# Patient Record
Sex: MALE | Race: WHITE | NOT HISPANIC OR LATINO | Employment: FULL TIME | URBAN - METROPOLITAN AREA
[De-identification: names, ages, dates, MRNs, and addresses within clinical notes are randomized per-mention and may not be internally consistent; named-entity substitution may affect disease eponyms.]

---

## 2019-08-12 DIAGNOSIS — N20.0 URIC ACID NEPHROLITHIASIS: Primary | ICD-10-CM

## 2019-08-14 ENCOUNTER — HOSPITAL ENCOUNTER (OUTPATIENT)
Dept: RADIOLOGY | Facility: HOSPITAL | Age: 34
Discharge: HOME | End: 2019-08-14
Payer: COMMERCIAL

## 2019-08-14 DIAGNOSIS — N20.0 URIC ACID NEPHROLITHIASIS: ICD-10-CM

## 2019-08-14 PROCEDURE — 74176 CT ABD & PELVIS W/O CONTRAST: CPT | Mod: TC

## 2019-11-12 ENCOUNTER — HOSPITAL ENCOUNTER (OUTPATIENT)
Dept: RADIOLOGY | Facility: HOSPITAL | Age: 34
Discharge: HOME | End: 2019-11-12
Attending: SPECIALIST
Payer: COMMERCIAL

## 2019-11-12 DIAGNOSIS — N20.0 URIC ACID NEPHROLITHIASIS: Primary | ICD-10-CM

## 2019-11-12 DIAGNOSIS — N20.0 URIC ACID NEPHROLITHIASIS: ICD-10-CM

## 2019-11-12 PROCEDURE — 74018 RADEX ABDOMEN 1 VIEW: CPT | Mod: TC,PO

## 2020-09-08 DIAGNOSIS — N20.0 CALCULUS OF KIDNEY: Primary | ICD-10-CM

## 2020-09-14 ENCOUNTER — HOSPITAL ENCOUNTER (OUTPATIENT)
Dept: RADIOLOGY | Facility: HOSPITAL | Age: 35
Discharge: HOME | End: 2020-09-14
Attending: INTERNAL MEDICINE
Payer: COMMERCIAL

## 2020-09-14 DIAGNOSIS — N20.0 CALCULUS OF KIDNEY: ICD-10-CM

## 2020-09-14 PROCEDURE — 76770 US EXAM ABDO BACK WALL COMP: CPT | Mod: TC,PO

## 2020-11-06 ENCOUNTER — HOSPITAL ENCOUNTER (OUTPATIENT)
Dept: RADIOLOGY | Facility: HOSPITAL | Age: 35
Discharge: HOME | End: 2020-11-06
Attending: SPECIALIST
Payer: COMMERCIAL

## 2020-11-06 DIAGNOSIS — N20.0 CALCULUS OF KIDNEY: Primary | ICD-10-CM

## 2020-11-06 DIAGNOSIS — N20.0 CALCULUS OF KIDNEY: ICD-10-CM

## 2020-11-06 PROCEDURE — 74018 RADEX ABDOMEN 1 VIEW: CPT | Mod: TC,PO

## 2021-07-01 ENCOUNTER — PATIENT MESSAGE (OUTPATIENT)
Dept: ADMINISTRATIVE | Facility: OTHER | Age: 36
End: 2021-07-01

## 2021-09-21 ENCOUNTER — TELEPHONE (OUTPATIENT)
Dept: FAMILY MEDICINE | Facility: CLINIC | Age: 36
End: 2021-09-21

## 2021-10-28 ENCOUNTER — OFFICE VISIT (OUTPATIENT)
Dept: FAMILY MEDICINE | Facility: CLINIC | Age: 36
End: 2021-10-28
Payer: COMMERCIAL

## 2021-10-28 VITALS
HEART RATE: 88 BPM | HEIGHT: 75 IN | BODY MASS INDEX: 22.88 KG/M2 | DIASTOLIC BLOOD PRESSURE: 70 MMHG | WEIGHT: 184 LBS | SYSTOLIC BLOOD PRESSURE: 114 MMHG

## 2021-10-28 DIAGNOSIS — N20.0 NEPHROLITHIASIS, URIC ACID: ICD-10-CM

## 2021-10-28 DIAGNOSIS — Z76.89 ENCOUNTER TO ESTABLISH CARE WITH NEW DOCTOR: Primary | ICD-10-CM

## 2021-10-28 DIAGNOSIS — Z13.29 SCREENING FOR ENDOCRINE DISORDER: ICD-10-CM

## 2021-10-28 DIAGNOSIS — M1A.0720 IDIOPATHIC CHRONIC GOUT OF LEFT FOOT WITHOUT TOPHUS: ICD-10-CM

## 2021-10-28 DIAGNOSIS — Z13.89 SCREENING FOR BLOOD OR PROTEIN IN URINE: ICD-10-CM

## 2021-10-28 DIAGNOSIS — Z79.899 LONG-TERM USE OF HIGH-RISK MEDICATION: ICD-10-CM

## 2021-10-28 DIAGNOSIS — Z13.6 SCREENING FOR ISCHEMIC HEART DISEASE (IHD): ICD-10-CM

## 2021-10-28 PROCEDURE — 99202 OFFICE O/P NEW SF 15 MIN: CPT | Mod: S$GLB,,, | Performed by: FAMILY MEDICINE

## 2021-10-28 PROCEDURE — 99202 PR OFFICE/OUTPT VISIT, NEW, LEVL II, 15-29 MIN: ICD-10-PCS | Mod: S$GLB,,, | Performed by: FAMILY MEDICINE

## 2021-10-28 RX ORDER — PYRIDOXINE HCL (VITAMIN B6) 100 MG
TABLET ORAL
COMMUNITY

## 2021-10-28 RX ORDER — PSEUDOEPHEDRINE HCL 120 MG
TABLET, EXTENDED RELEASE ORAL
COMMUNITY

## 2022-01-03 ENCOUNTER — HOSPITAL ENCOUNTER (OUTPATIENT)
Dept: RADIOLOGY | Facility: HOSPITAL | Age: 37
Discharge: HOME | End: 2022-01-03
Attending: SPECIALIST
Payer: COMMERCIAL

## 2022-01-03 DIAGNOSIS — N20.0 URIC ACID NEPHROLITHIASIS: Primary | ICD-10-CM

## 2022-01-03 DIAGNOSIS — N20.0 URIC ACID NEPHROLITHIASIS: ICD-10-CM

## 2022-01-03 PROCEDURE — 74018 RADEX ABDOMEN 1 VIEW: CPT | Mod: TC,PO

## 2022-01-07 ENCOUNTER — TELEPHONE (OUTPATIENT)
Dept: FAMILY MEDICINE | Facility: CLINIC | Age: 37
End: 2022-01-07
Payer: COMMERCIAL

## 2022-04-07 ENCOUNTER — TELEPHONE (OUTPATIENT)
Dept: FAMILY MEDICINE | Facility: CLINIC | Age: 37
End: 2022-04-07
Payer: COMMERCIAL

## 2022-04-20 ENCOUNTER — TELEPHONE (OUTPATIENT)
Dept: FAMILY MEDICINE | Facility: CLINIC | Age: 37
End: 2022-04-20

## 2022-04-20 NOTE — TELEPHONE ENCOUNTER
----- Message from America Baldwin sent at 4/20/2022  4:11 PM CDT -----  Pt called to r/s appt he moved it up to this Friday and will be doing the labs that were not included in the lab report from Joseph different doctor on same day of the appt. If the Dr. Sheriff wants everything repeated he would like a call back so he knows he just does not want to repeat test if not necessary incase ins will not cover again so soon. [phone]

## 2022-04-22 ENCOUNTER — OFFICE VISIT (OUTPATIENT)
Dept: FAMILY MEDICINE | Facility: CLINIC | Age: 37
End: 2022-04-22
Payer: COMMERCIAL

## 2022-04-22 DIAGNOSIS — M1A.0720 IDIOPATHIC CHRONIC GOUT OF LEFT FOOT WITHOUT TOPHUS: ICD-10-CM

## 2022-04-22 DIAGNOSIS — N20.0 NEPHROLITHIASIS, URIC ACID: Primary | ICD-10-CM

## 2022-04-22 PROCEDURE — 99213 OFFICE O/P EST LOW 20 MIN: CPT | Mod: S$GLB,,, | Performed by: FAMILY MEDICINE

## 2022-04-22 PROCEDURE — 99213 PR OFFICE/OUTPT VISIT, EST, LEVL III, 20-29 MIN: ICD-10-PCS | Mod: S$GLB,,, | Performed by: FAMILY MEDICINE

## 2022-04-22 RX ORDER — POTASSIUM CITRATE 15 MEQ/1
2 TABLET, EXTENDED RELEASE ORAL 2 TIMES DAILY
COMMUNITY
Start: 2022-04-03

## 2022-04-22 RX ORDER — HYDROCHLOROTHIAZIDE 25 MG/1
25 TABLET ORAL DAILY
COMMUNITY
Start: 2022-04-08

## 2022-04-22 NOTE — PROGRESS NOTES
SUBJECTIVE:    Patient ID: Chi Whitney is a 37 y.o. male.    Chief Complaint: Follow-up (6mth, went over meds verbally// SW)    Pt here to checkup on acute and chronic conditions.     Hx kidney stones.      Has seen Dr. Urbina since last visit. He restarted his potassium citrate and HCTZ. Also taking B6 and magnesium oxide.  Was also taking vitamin D, but isn't taking right now.     Has not had any gout flares since last visit. Has not been taking allopurinol.    Has seen Dr. Cazares at the end of the year, with [facility]. Had a vasectomy in Feb. .    Had labs done today, so not available for review at visit today.    Does exercise once a week at gym and does some exercise at home. Has not been running lately.     ---------------------------------------------        No visits with results within 6 Month(s) from this visit.   Latest known visit with results is:   No results found for any previous visit.       History reviewed. No pertinent past medical history.  Social History     Socioeconomic History    Marital status:    Tobacco Use    Smoking status: Never Smoker    Smokeless tobacco: Never Used   Substance and Sexual Activity    Alcohol use: Never     Past Surgical History:   Procedure Laterality Date    DENTAL SURGERY      EYE SURGERY Right     muscle surgery x 2    LITHOTRIPSY      VASECTOMY       Family History   Problem Relation Age of Onset    Cancer Father        Review of patient's allergies indicates:  No Known Allergies    Current Outpatient Medications:     hydroCHLOROthiazide (HYDRODIURIL) 25 MG tablet, Take 25 mg by mouth once daily., Disp: , Rfl:     magnesium oxide,aspartate,citr (TRIPLE MAGNESIUM COMPLEX) 400 mg magnesium Cap, 3 (three) times a day, Disp: , Rfl:     potassium citrate (UROCIT-K 15) 15 mEq TbSR, Take 1 tablet by mouth 2 (two) times daily., Disp: , Rfl:     pyridoxine, vitamin B6, (B-6) 100 MG Tab, 2 (two) times a day, Disp: , Rfl:     Review of Systems  "  Constitutional: Negative for appetite change, fatigue, fever and unexpected weight change.   Respiratory: Negative for cough, chest tightness, shortness of breath and wheezing.    Cardiovascular: Negative for chest pain and leg swelling.   Gastrointestinal: Negative for abdominal pain, constipation, nausea and vomiting.        -heartburn   Genitourinary: Negative for decreased urine volume, difficulty urinating, dysuria and frequency.   Musculoskeletal: Negative for arthralgias, back pain, myalgias and neck pain.   Skin: Negative for rash.   Neurological: Negative for dizziness, weakness, numbness and headaches.   Hematological: Does not bruise/bleed easily.   Psychiatric/Behavioral: Negative for dysphoric mood, sleep disturbance and suicidal ideas. The patient is not nervous/anxious.    All other systems reviewed and are negative.         Objective:      Vitals:    04/22/22 0815   BP: (P) 112/74   Pulse: (P) 84   Weight: (P) 83.5 kg (184 lb)   Height: (P) 6' 2.25" (1.886 m)     Physical Exam  Vitals reviewed.   Constitutional:       General: He is not in acute distress.     Appearance: Normal appearance. He is well-developed.   HENT:      Head: Normocephalic and atraumatic.   Neck:      Thyroid: No thyromegaly.   Cardiovascular:      Rate and Rhythm: Normal rate and regular rhythm.      Heart sounds: Normal heart sounds. No murmur heard.    No friction rub.   Pulmonary:      Effort: Pulmonary effort is normal.      Breath sounds: Normal breath sounds. No wheezing or rales.   Abdominal:      General: Bowel sounds are normal. There is no distension.      Palpations: Abdomen is soft.      Tenderness: There is no abdominal tenderness.   Musculoskeletal:      Cervical back: Neck supple.   Lymphadenopathy:      Cervical: No cervical adenopathy.   Skin:     General: Skin is warm and dry.      Findings: No rash.   Neurological:      Mental Status: He is alert and oriented to person, place, and time.   Psychiatric:        "  Attention and Perception: He is attentive.         Speech: Speech normal.         Behavior: Behavior normal.         Thought Content: Thought content normal.         Judgment: Judgment normal.           Assessment:       1. Nephrolithiasis, uric acid    2. Idiopathic chronic gout of left foot without tophus         Plan:       Nephrolithiasis, uric acid  Comments:  Intermittent. To continue potassium, etc. per Renal.    Idiopathic chronic gout of left foot without tophus  Comments:  Aymptomatic.. To continue to follow with Renal, Urology    Labs pending.     Follow up in about 6 months (around 10/22/2022).        4/22/2022 Ketty Sheriff

## 2022-04-24 LAB
ALBUMIN SERPL-MCNC: 4.4 G/DL (ref 3.6–5.1)
ALBUMIN/CREAT UR: 45 MCG/MG CREAT
ALBUMIN/GLOB SERPL: 1.4 (CALC) (ref 1–2.5)
ALP SERPL-CCNC: 113 U/L (ref 36–130)
ALT SERPL-CCNC: 36 U/L (ref 9–46)
APPEARANCE UR: CLEAR
AST SERPL-CCNC: 28 U/L (ref 10–40)
BACTERIA #/AREA URNS HPF: ABNORMAL /HPF
BACTERIA UR CULT: ABNORMAL
BACTERIA UR CULT: ABNORMAL
BASOPHILS # BLD AUTO: 52 CELLS/UL (ref 0–200)
BASOPHILS NFR BLD AUTO: 0.7 %
BILIRUB SERPL-MCNC: 1.2 MG/DL (ref 0.2–1.2)
BILIRUB UR QL STRIP: NEGATIVE
BUN SERPL-MCNC: 21 MG/DL (ref 7–25)
BUN/CREAT SERPL: NORMAL (CALC) (ref 6–22)
CALCIUM SERPL-MCNC: 9.7 MG/DL (ref 8.6–10.3)
CHLORIDE SERPL-SCNC: 101 MMOL/L (ref 98–110)
CHOLEST SERPL-MCNC: 197 MG/DL
CHOLEST/HDLC SERPL: 3 (CALC)
CO2 SERPL-SCNC: 31 MMOL/L (ref 20–32)
COLOR UR: YELLOW
CREAT SERPL-MCNC: 1.3 MG/DL (ref 0.6–1.35)
CREAT UR-MCNC: 93 MG/DL (ref 20–320)
EOSINOPHIL # BLD AUTO: 148 CELLS/UL (ref 15–500)
EOSINOPHIL NFR BLD AUTO: 2 %
ERYTHROCYTE [DISTWIDTH] IN BLOOD BY AUTOMATED COUNT: 12.4 % (ref 11–15)
GLOBULIN SER CALC-MCNC: 3.1 G/DL (CALC) (ref 1.9–3.7)
GLUCOSE SERPL-MCNC: 91 MG/DL (ref 65–99)
GLUCOSE UR QL STRIP: NEGATIVE
HCT VFR BLD AUTO: 48.7 % (ref 38.5–50)
HDLC SERPL-MCNC: 66 MG/DL
HGB BLD-MCNC: 16.2 G/DL (ref 13.2–17.1)
HGB UR QL STRIP: ABNORMAL
HYALINE CASTS #/AREA URNS LPF: ABNORMAL /LPF
KETONES UR QL STRIP: NEGATIVE
LDLC SERPL CALC-MCNC: 114 MG/DL (CALC)
LEUKOCYTE ESTERASE UR QL STRIP: ABNORMAL
LYMPHOCYTES # BLD AUTO: 2427 CELLS/UL (ref 850–3900)
LYMPHOCYTES NFR BLD AUTO: 32.8 %
MCH RBC QN AUTO: 29.5 PG (ref 27–33)
MCHC RBC AUTO-ENTMCNC: 33.3 G/DL (ref 32–36)
MCV RBC AUTO: 88.7 FL (ref 80–100)
MICROALBUMIN UR-MCNC: 4.2 MG/DL
MONOCYTES # BLD AUTO: 599 CELLS/UL (ref 200–950)
MONOCYTES NFR BLD AUTO: 8.1 %
NEUTROPHILS # BLD AUTO: 4174 CELLS/UL (ref 1500–7800)
NEUTROPHILS NFR BLD AUTO: 56.4 %
NITRITE UR QL STRIP: NEGATIVE
NONHDLC SERPL-MCNC: 131 MG/DL (CALC)
PH UR STRIP: 7.5 [PH] (ref 5–8)
PLATELET # BLD AUTO: 316 THOUSAND/UL (ref 140–400)
PMV BLD REES-ECKER: 10.1 FL (ref 7.5–12.5)
POTASSIUM SERPL-SCNC: 4.2 MMOL/L (ref 3.5–5.3)
PROT SERPL-MCNC: 7.5 G/DL (ref 6.1–8.1)
PROT UR QL STRIP: NEGATIVE
RBC # BLD AUTO: 5.49 MILLION/UL (ref 4.2–5.8)
RBC #/AREA URNS HPF: ABNORMAL /HPF
SODIUM SERPL-SCNC: 140 MMOL/L (ref 135–146)
SP GR UR STRIP: 1.01 (ref 1–1.03)
SQUAMOUS #/AREA URNS HPF: ABNORMAL /HPF
TRIGL SERPL-MCNC: 71 MG/DL
TSH SERPL-ACNC: 1.53 MIU/L (ref 0.4–4.5)
URATE SERPL-MCNC: 8.5 MG/DL (ref 4–8)
WBC # BLD AUTO: 7.4 THOUSAND/UL (ref 3.8–10.8)
WBC #/AREA URNS HPF: ABNORMAL /HPF

## 2022-04-25 ENCOUNTER — TELEPHONE (OUTPATIENT)
Dept: FAMILY MEDICINE | Facility: CLINIC | Age: 37
End: 2022-04-25

## 2022-10-24 ENCOUNTER — TELEPHONE (OUTPATIENT)
Dept: FAMILY MEDICINE | Facility: CLINIC | Age: 37
End: 2022-10-24

## 2022-10-24 ENCOUNTER — OFFICE VISIT (OUTPATIENT)
Dept: FAMILY MEDICINE | Facility: CLINIC | Age: 37
End: 2022-10-24
Payer: COMMERCIAL

## 2022-10-24 VITALS
BODY MASS INDEX: 23.87 KG/M2 | HEART RATE: 65 BPM | WEIGHT: 186 LBS | OXYGEN SATURATION: 100 % | DIASTOLIC BLOOD PRESSURE: 74 MMHG | SYSTOLIC BLOOD PRESSURE: 122 MMHG | HEIGHT: 74 IN

## 2022-10-24 DIAGNOSIS — Z13.6 SCREENING FOR ISCHEMIC HEART DISEASE (IHD): ICD-10-CM

## 2022-10-24 DIAGNOSIS — Z13.29 SCREENING FOR ENDOCRINE DISORDER: ICD-10-CM

## 2022-10-24 DIAGNOSIS — M1A.0720 IDIOPATHIC CHRONIC GOUT OF LEFT FOOT WITHOUT TOPHUS: Primary | ICD-10-CM

## 2022-10-24 DIAGNOSIS — Z79.899 LONG-TERM USE OF HIGH-RISK MEDICATION: Primary | ICD-10-CM

## 2022-10-24 DIAGNOSIS — Z23 INFLUENZA VACCINE ADMINISTERED: ICD-10-CM

## 2022-10-24 PROCEDURE — 99213 PR OFFICE/OUTPT VISIT, EST, LEVL III, 20-29 MIN: ICD-10-PCS | Mod: 25,S$GLB,, | Performed by: FAMILY MEDICINE

## 2022-10-24 PROCEDURE — 90682 RIV4 VACC RECOMBINANT DNA IM: CPT | Mod: S$GLB,,, | Performed by: FAMILY MEDICINE

## 2022-10-24 PROCEDURE — 99213 OFFICE O/P EST LOW 20 MIN: CPT | Mod: 25,S$GLB,, | Performed by: FAMILY MEDICINE

## 2022-10-24 PROCEDURE — 90682 FLU VACCINE - QUADRIVALENT (RECOMBINANT) PRESERVATIVE FREE: ICD-10-PCS | Mod: S$GLB,,, | Performed by: FAMILY MEDICINE

## 2022-10-24 PROCEDURE — 90471 FLU VACCINE - QUADRIVALENT (RECOMBINANT) PRESERVATIVE FREE: ICD-10-PCS | Mod: S$GLB,,, | Performed by: FAMILY MEDICINE

## 2022-10-24 PROCEDURE — 90471 IMMUNIZATION ADMIN: CPT | Mod: S$GLB,,, | Performed by: FAMILY MEDICINE

## 2022-10-24 NOTE — PROGRESS NOTES
SUBJECTIVE:    Patient ID: Chi Whitney is a 37 y.o. male.    Chief Complaint: 6 month follow up    Pt here to checkup on acute and chronic conditions.     Hx kidney stones.      Has not passed any stones. Saw Dr. Urbina on 8/31/22. Has some labs done.  Needs to get 24 hour urine.   Still taking potassium citrate and HCTZ, B6 and magnesium oxide.      Has not had any gout flares since last visit. Not on allopurinol.     Sees Dr. Cazares at the end of the year, with [facility]. Last saw in Jan 2022.       Has been exercising again once a week at gym. Has been running again. Ran a 5K in August.    ---------------------------------------------            History reviewed. No pertinent past medical history.  Social History     Socioeconomic History    Marital status:    Tobacco Use    Smoking status: Never    Smokeless tobacco: Never   Substance and Sexual Activity    Alcohol use: Never     Past Surgical History:   Procedure Laterality Date    DENTAL SURGERY      EYE SURGERY Right     muscle surgery x 2    LITHOTRIPSY      VASECTOMY       Family History   Problem Relation Age of Onset    Cancer Father        Review of patient's allergies indicates:  No Known Allergies    Current Outpatient Medications:     hydroCHLOROthiazide (HYDRODIURIL) 25 MG tablet, Take 25 mg by mouth once daily., Disp: , Rfl:     magnesium oxide,aspartate,citr (TRIPLE MAGNESIUM COMPLEX) 400 mg magnesium Cap, 3 (three) times a day, Disp: , Rfl:     potassium citrate (UROCIT-K 15) 15 mEq TbSR, Take 1 tablet by mouth 2 (two) times daily., Disp: , Rfl:     pyridoxine, vitamin B6, (B-6) 100 MG Tab, 2 (two) times a day, Disp: , Rfl:     Review of Systems   Constitutional:  Negative for appetite change, fatigue, fever and unexpected weight change.   Respiratory:  Negative for cough, chest tightness, shortness of breath and wheezing.    Cardiovascular:  Negative for chest pain and leg swelling.   Gastrointestinal:  Negative for abdominal pain,  "constipation, nausea and vomiting.        -heartburn   Genitourinary:  Negative for decreased urine volume, difficulty urinating, dysuria and frequency.   Musculoskeletal:  Negative for arthralgias, back pain, myalgias and neck pain.   Skin:  Negative for rash.   Neurological:  Negative for dizziness, weakness, numbness and headaches.   Hematological:  Does not bruise/bleed easily.   Psychiatric/Behavioral:  Negative for dysphoric mood, sleep disturbance and suicidal ideas. The patient is not nervous/anxious.    All other systems reviewed and are negative.       Objective:      Vitals:    10/24/22 0818   BP: 122/74   Pulse: 65   SpO2: 100%   Weight: 84.4 kg (186 lb)   Height: 6' 2.25" (1.886 m)     Physical Exam  Vitals reviewed.   Constitutional:       General: He is not in acute distress.     Appearance: Normal appearance. He is well-developed.   HENT:      Head: Normocephalic and atraumatic.   Neck:      Thyroid: No thyromegaly.   Cardiovascular:      Rate and Rhythm: Normal rate and regular rhythm.      Heart sounds: Normal heart sounds. No murmur heard.    No friction rub.   Pulmonary:      Effort: Pulmonary effort is normal.      Breath sounds: Normal breath sounds. No wheezing or rales.   Abdominal:      General: Bowel sounds are normal. There is no distension.      Palpations: Abdomen is soft.      Tenderness: There is no abdominal tenderness.   Musculoskeletal:      Cervical back: Neck supple.   Lymphadenopathy:      Cervical: No cervical adenopathy.   Skin:     General: Skin is warm and dry.      Findings: No rash.   Neurological:      Mental Status: He is alert and oriented to person, place, and time.   Psychiatric:         Attention and Perception: He is attentive.         Speech: Speech normal.         Behavior: Behavior normal.         Thought Content: Thought content normal.         Judgment: Judgment normal.         Assessment:       1. Idiopathic chronic gout of left foot without tophus    2. " Influenza vaccine administered         Plan:       Idiopathic chronic gout of left foot without tophus  Comments:  Asymptomatic. Will continue to monitor    Influenza vaccine administered  -     Influenza (FLUBLOK) - Quadrivalent (Recombinant) *Preferred* (PF) - egg allergy    Will get labs from Dr. Urbina.    Follow up in about 1 year (around 10/24/2023) for gout.        10/24/2022 Ketty Sheriff

## 2022-10-24 NOTE — TELEPHONE ENCOUNTER
The most recent in [facility] system for Dr Urbina is January 2022. Nothing prior to that except for your labs from April 2022

## 2022-10-24 NOTE — TELEPHONE ENCOUNTER
----- Message from Ketty Sheriff MD sent at 10/24/2022  9:07 AM CDT -----  Could you get his labs from [facility] Dr Urbina done around August, let me know when in.

## 2022-11-03 NOTE — TELEPHONE ENCOUNTER
There are some labs I will need at his next visit that Dr. Urbina does not get. They were added to be done before his next appt.

## 2022-11-11 DIAGNOSIS — M1A.0720 IDIOPATHIC CHRONIC GOUT OF LEFT FOOT WITHOUT TOPHUS: Primary | ICD-10-CM

## 2022-11-11 RX ORDER — COLCHICINE 0.6 MG/1
TABLET ORAL
Qty: 18 TABLET | Refills: 0 | Status: SHIPPED | OUTPATIENT
Start: 2022-11-11 | End: 2023-08-10 | Stop reason: SDUPTHER

## 2022-11-11 RX ORDER — INDOMETHACIN 50 MG/1
50 CAPSULE ORAL 3 TIMES DAILY PRN
Qty: 30 CAPSULE | Refills: 1 | Status: SHIPPED | OUTPATIENT
Start: 2022-11-11

## 2022-11-11 NOTE — TELEPHONE ENCOUNTER
----- Message from Caity Odom sent at 11/11/2022  8:13 AM CST -----  Pt is having a gout flare up and would like something called in   [facility]  [phone]

## 2023-01-17 ENCOUNTER — HOSPITAL ENCOUNTER (OUTPATIENT)
Dept: RADIOLOGY | Facility: HOSPITAL | Age: 38
Discharge: HOME | End: 2023-01-17
Attending: SPECIALIST
Payer: COMMERCIAL

## 2023-01-17 DIAGNOSIS — N20.0 URIC ACID NEPHROLITHIASIS: ICD-10-CM

## 2023-01-17 PROCEDURE — 74018 RADEX ABDOMEN 1 VIEW: CPT | Mod: TC,PO

## 2023-04-03 ENCOUNTER — TELEPHONE (OUTPATIENT)
Dept: FAMILY MEDICINE | Facility: CLINIC | Age: 38
End: 2023-04-03

## 2023-04-03 NOTE — TELEPHONE ENCOUNTER
----- Message from Misty Gentile MA sent at 4/3/2023  8:56 AM CDT -----  Regarding: returning your call for appt  Would like to be seen today for lesion on nose for a few months.  [phone]

## 2023-04-13 ENCOUNTER — OFFICE VISIT (OUTPATIENT)
Dept: FAMILY MEDICINE | Facility: CLINIC | Age: 38
End: 2023-04-13
Payer: COMMERCIAL

## 2023-04-13 VITALS
OXYGEN SATURATION: 99 % | SYSTOLIC BLOOD PRESSURE: 124 MMHG | HEART RATE: 82 BPM | DIASTOLIC BLOOD PRESSURE: 70 MMHG | WEIGHT: 194 LBS | BODY MASS INDEX: 24.9 KG/M2 | HEIGHT: 74 IN

## 2023-04-13 DIAGNOSIS — M1A.0720 IDIOPATHIC CHRONIC GOUT OF LEFT FOOT WITHOUT TOPHUS: ICD-10-CM

## 2023-04-13 DIAGNOSIS — L98.9 SKIN LESION: Primary | ICD-10-CM

## 2023-04-13 DIAGNOSIS — Z79.899 LONG-TERM USE OF HIGH-RISK MEDICATION: ICD-10-CM

## 2023-04-13 DIAGNOSIS — N20.0 NEPHROLITHIASIS, URIC ACID: ICD-10-CM

## 2023-04-13 PROCEDURE — 99214 PR OFFICE/OUTPT VISIT, EST, LEVL IV, 30-39 MIN: ICD-10-PCS | Mod: S$GLB,,, | Performed by: FAMILY MEDICINE

## 2023-04-13 PROCEDURE — 99214 OFFICE O/P EST MOD 30 MIN: CPT | Mod: S$GLB,,, | Performed by: FAMILY MEDICINE
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
## 2023-04-13 NOTE — PATIENT INSTRUCTIONS
Reji Mendoza MD- DERM  [address]  [address]  [place] [address]  Phone: [phone]  Fax: [phone]

## 2023-10-11 ENCOUNTER — TELEPHONE (OUTPATIENT)
Dept: FAMILY MEDICINE | Facility: CLINIC | Age: 38
End: 2023-10-11

## 2023-10-20 ENCOUNTER — TELEPHONE (OUTPATIENT)
Dept: FAMILY MEDICINE | Facility: CLINIC | Age: 38
End: 2023-10-20

## 2023-10-20 NOTE — TELEPHONE ENCOUNTER
----- Message from [facility], RT sent at 11/4/2022 10:13 AM CDT -----  Regarding: Lab due prior to 10/26 visit  11/3/2022 There are some labs I will need at his next visit that Dr. Urbina does not get. They were added to be done before his next appt.

## 2023-10-22 LAB
CHOLEST SERPL-MCNC: 212 MG/DL
CHOLEST/HDLC SERPL: 2.9 (CALC)
HDLC SERPL-MCNC: 72 MG/DL
LDLC SERPL CALC-MCNC: 123 MG/DL (CALC)
NONHDLC SERPL-MCNC: 140 MG/DL (CALC)
TRIGL SERPL-MCNC: 80 MG/DL
TSH SERPL-ACNC: 1.27 MIU/L (ref 0.4–4.5)

## 2023-10-26 ENCOUNTER — OFFICE VISIT (OUTPATIENT)
Dept: FAMILY MEDICINE | Facility: CLINIC | Age: 38
End: 2023-10-26
Payer: COMMERCIAL

## 2023-10-26 VITALS
BODY MASS INDEX: 25.54 KG/M2 | DIASTOLIC BLOOD PRESSURE: 78 MMHG | WEIGHT: 199 LBS | HEART RATE: 62 BPM | HEIGHT: 74 IN | SYSTOLIC BLOOD PRESSURE: 116 MMHG

## 2023-10-26 DIAGNOSIS — Z00.00 ANNUAL PHYSICAL EXAM: Primary | ICD-10-CM

## 2023-10-26 DIAGNOSIS — R12 HEARTBURN: ICD-10-CM

## 2023-10-26 DIAGNOSIS — Z23 INFLUENZA VACCINE ADMINISTERED: ICD-10-CM

## 2023-10-26 DIAGNOSIS — M1A.0720 IDIOPATHIC CHRONIC GOUT OF LEFT FOOT WITHOUT TOPHUS: ICD-10-CM

## 2023-10-26 DIAGNOSIS — N20.0 NEPHROLITHIASIS, URIC ACID: ICD-10-CM

## 2023-10-26 PROCEDURE — 90471 IMMUNIZATION ADMIN: CPT | Mod: S$GLB,,, | Performed by: FAMILY MEDICINE

## 2023-10-26 PROCEDURE — 90471 FLU VACCINE (QUAD) GREATER THAN OR EQUAL TO 3YO PRESERVATIVE FREE IM: ICD-10-PCS | Mod: S$GLB,,, | Performed by: FAMILY MEDICINE

## 2023-10-26 PROCEDURE — 99395 PREV VISIT EST AGE 18-39: CPT | Mod: 25,S$GLB,, | Performed by: FAMILY MEDICINE

## 2023-10-26 PROCEDURE — 90686 IIV4 VACC NO PRSV 0.5 ML IM: CPT | Mod: S$GLB,,, | Performed by: FAMILY MEDICINE

## 2023-10-26 PROCEDURE — 90686 FLU VACCINE (QUAD) GREATER THAN OR EQUAL TO 3YO PRESERVATIVE FREE IM: ICD-10-PCS | Mod: S$GLB,,, | Performed by: FAMILY MEDICINE

## 2023-10-26 PROCEDURE — 99395 PR PREVENTIVE VISIT,EST,18-39: ICD-10-PCS | Mod: 25,S$GLB,, | Performed by: FAMILY MEDICINE

## 2023-10-26 NOTE — PROGRESS NOTES
SUBJECTIVE:    Patient ID: Chi Whitney is a 38 y.o. male.    Chief Complaint: Annual Exam (Annual wellness exam//no med bottles//fluarix ordered//tc)      Pt here for an annual exam.     Pt saw Derm for lesion on his nose.  They froze it off and to follow up yearly.       Hx kidney stones.  Saw Dr. Urbina on 9/6/2023. (Q 6 m)Still doing 24 hour urine.   He increased his potassium citrate from 3 tabs to 4 tabs daily. Still on HCTZ, B6 and magnesium oxide.      Has not had any gout flares since last visit. Not on allopurinol.     Saw Dr. Cazares 1/2023, with [facility]. (Yearly)    Has been having issues with acid reflux of late. Used to get it in the past rarely. It always happens at night, waking him up in the middle of the night. Does eat late at night sometimes. But it not necessarily related to food.  Takes 2 tums once a day if needed.     Has been exercising again once a week at gym. Has been running again. Has gained some weight.     ---------------------------------------------  Has gotten flu.              History reviewed. No pertinent past medical history.  Social History     Socioeconomic History    Marital status:    Tobacco Use    Smoking status: Never    Smokeless tobacco: Never   Substance and Sexual Activity    Alcohol use: Never     Past Surgical History:   Procedure Laterality Date    DENTAL SURGERY      EYE SURGERY Right     muscle surgery x 2    LITHOTRIPSY      VASECTOMY       Family History   Problem Relation Age of Onset    Cancer Father        Review of patient's allergies indicates:  No Known Allergies    Current Outpatient Medications:     colchicine (COLCRYS) 0.6 mg tablet, Two tablets PO Once, then one tablet one hour later.  Do not repeat for 3 days., Disp: 30 tablet, Rfl: 0    hydroCHLOROthiazide (HYDRODIURIL) 25 MG tablet, Take 25 mg by mouth once daily., Disp: , Rfl:     indomethacin (INDOCIN) 50 MG capsule, Take 1 capsule (50 mg total) by mouth 3 (three) times daily as  "needed (pain)., Disp: 30 capsule, Rfl: 1    magnesium oxide,aspartate,citr (TRIPLE MAGNESIUM COMPLEX) 400 mg magnesium Cap, 3 (three) times a day, Disp: , Rfl:     potassium citrate (UROCIT-K 15) 15 mEq TbSR, Take 2 tablets by mouth 2 (two) times a day., Disp: , Rfl:     pyridoxine, vitamin B6, (B-6) 100 MG Tab, 2 (two) times a day, Disp: , Rfl:     Review of Systems   Constitutional:  Negative for appetite change, fatigue, fever and unexpected weight change.   Respiratory:  Negative for cough, chest tightness, shortness of breath and wheezing.    Cardiovascular:  Negative for chest pain and leg swelling.   Gastrointestinal:  Negative for abdominal pain, constipation, nausea and vomiting.        -heartburn   Genitourinary:  Negative for decreased urine volume, difficulty urinating, dysuria and frequency.   Musculoskeletal:  Negative for arthralgias, back pain, myalgias and neck pain.   Skin:  Negative for rash.        +Skin lesion on nose   Neurological:  Negative for dizziness, weakness, numbness and headaches.   Hematological:  Does not bruise/bleed easily.   Psychiatric/Behavioral:  Negative for dysphoric mood, sleep disturbance and suicidal ideas. The patient is not nervous/anxious.    All other systems reviewed and are negative.         Objective:      Vitals:    10/26/23 0815   BP: 116/78   Pulse: 62   Weight: 90.3 kg (199 lb)   Height: 6' 2" (1.88 m)     Wt Readings from Last 3 Encounters:   10/26/23 90.3 kg (199 lb)   04/13/23 88 kg (194 lb)   10/24/22 84.4 kg (186 lb)         Physical Exam  Vitals reviewed.   Constitutional:       General: He is not in acute distress.     Appearance: Normal appearance. He is well-developed.   HENT:      Head: Normocephalic and atraumatic.   Neck:      Thyroid: No thyromegaly.   Cardiovascular:      Rate and Rhythm: Normal rate and regular rhythm.      Heart sounds: Normal heart sounds. No murmur heard.     No friction rub.   Pulmonary:      Effort: Pulmonary effort is " normal.      Breath sounds: Normal breath sounds. No wheezing or rales.   Abdominal:      General: Bowel sounds are normal. There is no distension.      Palpations: Abdomen is soft.      Tenderness: There is no abdominal tenderness.   Musculoskeletal:      Cervical back: Neck supple.   Lymphadenopathy:      Cervical: No cervical adenopathy.   Skin:     General: Skin is warm and dry.      Findings: No rash.   Neurological:      Mental Status: He is alert and oriented to person, place, and time.   Psychiatric:         Attention and Perception: He is attentive.         Speech: Speech normal.         Behavior: Behavior normal.         Thought Content: Thought content normal.         Judgment: Judgment normal.           Assessment:       1. Annual physical exam    2. Idiopathic chronic gout of left foot without tophus    3. Heartburn    4. Nephrolithiasis, uric acid    5. Influenza vaccine administered           Plan:       Annual physical exam    Idiopathic chronic gout of left foot without tophus  Comments:  Asymptomatic. Will continue to monitor symtpoms    Heartburn  Comments:  New issue. Possibly side effect from potassium citrate, advised pt to take it earlier in day. Can continue prn tums if needed. To notify if worsens.    Nephrolithiasis, uric acid  Comments:  Chronic. To continue to follow w/ Renal/Urology.    Influenza vaccine administered  -     Influenza - Quadrivalent *Preferred* (6 months+) (PF)      Will get labs from Dr. Urbina.    Follow up in about 6 months (around 4/26/2024) for GERD, Gout.        10/26/2023 Ketty Sheriff

## 2023-10-27 ENCOUNTER — TELEPHONE (OUTPATIENT)
Dept: FAMILY MEDICINE | Facility: CLINIC | Age: 38
End: 2023-10-27

## 2023-10-27 NOTE — LETTER
[address]  [place] [address]  Phone: [phone]   [phone]                        MD Ketty Baez MD Chequita Williams, MD Matthew Bassett, PA-C Linda Melerine, DAVONTE Cook, DAVONTE Rogers, DAVONTE      Date: 10/27/2023        Patient: Chi Whitney  YOB: 1985      Please fax last lab and office visit note.      Sincerely,     Catalina Sharpe LPN

## 2023-10-27 NOTE — TELEPHONE ENCOUNTER
----- Message from Ketty Sheriff MD sent at 10/26/2023  9:04 AM CDT -----  Could you request last lab and note from Dr. Urbina 9/6/2023

## 2024-02-23 ENCOUNTER — HOSPITAL ENCOUNTER (OUTPATIENT)
Dept: RADIOLOGY | Facility: HOSPITAL | Age: 39
Discharge: HOME | End: 2024-02-23
Attending: SPECIALIST
Payer: COMMERCIAL

## 2024-02-23 DIAGNOSIS — N20.1 URETERAL STONE: Primary | ICD-10-CM

## 2024-02-23 DIAGNOSIS — N20.1 URETERAL STONE: ICD-10-CM

## 2024-02-23 PROCEDURE — 74018 RADEX ABDOMEN 1 VIEW: CPT | Mod: TC,PO

## 2024-04-09 ENCOUNTER — TELEPHONE (OUTPATIENT)
Dept: FAMILY MEDICINE | Facility: CLINIC | Age: 39
End: 2024-04-09

## 2024-04-09 NOTE — TELEPHONE ENCOUNTER
LMOR to reschedule appointment with Dr. Sheriff . Provider will be out of the office. Pt advised to call back to reschedule appt.

## 2024-04-11 ENCOUNTER — TELEPHONE (OUTPATIENT)
Dept: FAMILY MEDICINE | Facility: CLINIC | Age: 39
End: 2024-04-11
Payer: COMMERCIAL

## 2024-04-11 NOTE — TELEPHONE ENCOUNTER
LMOR to reschedule appointment with Dr. Sheriff . Provider will be out of the office. Informed pt appt has been canceled.

## 2024-04-15 ENCOUNTER — TELEPHONE (OUTPATIENT)
Dept: FAMILY MEDICINE | Facility: CLINIC | Age: 39
End: 2024-04-15
Payer: COMMERCIAL

## 2024-04-15 NOTE — TELEPHONE ENCOUNTER
----- Message from Kim Christianson sent at 4/15/2024  2:10 PM CDT -----  Pt needs to reschedule his cancelled appt. Pt #[phone]

## 2024-06-09 NOTE — PROGRESS NOTES
SUBJECTIVE:    Patient ID: Chi Whitney is a 39 y.o. male.    Chief Complaint: Diarrhea (No bottles, constant diarrhea, for months, pt went to the dermatologist last week, abc )      Pt here to check up on acute and chronic conditions.     Pt seeing Derm for lesion on his nose.  They froze it off and to follow up yearly. Has been recently and has been frozen again. Next year if still there, they may do a different mode of treatment.         Has been having diarrhea for months. Since he has been on magnesium he thinks that is part of the problem as it is a side effect. Taking three 400mg tablets daily. Has been taking probiotics the last few  months that seems like it has helped. Having watery stools sometimes. Ranges from watery to completely normal. Having 3-5 times per day. The frequency is less when he is having normal stools.     Hx kidney stones.  Saw Dr. Urbina on 3/6/2024. (Q 6 m)   He is on potassium citrate from 4 tabs daily. Still on HCTZ, B6 and magnesium oxide.  Labs from Dr. Urbina reviewed.    Has not had any gout flares since last visit. Not on allopurinol.     Saw Dr. Alarcon 1/2024, with [facility]. (Yearly)    No longer having as much heartburn issues now that he is taking potassium citrate in the morning and at lunch.     Has been exercising again once a week at gym. Runs and swims for exercise.  Has gained a few pounds since last visit.     ------------------------------------------------                History reviewed. No pertinent past medical history.  Social History     Socioeconomic History    Marital status:    Tobacco Use    Smoking status: Never    Smokeless tobacco: Never   Substance and Sexual Activity    Alcohol use: Never     Past Surgical History:   Procedure Laterality Date    DENTAL SURGERY      EYE SURGERY Right     muscle surgery x 2    LITHOTRIPSY      VASECTOMY       Family History   Problem Relation Name Age of Onset    Cancer Father         Review of patient's  "allergies indicates:  No Known Allergies    Current Outpatient Medications:     colchicine (COLCRYS) 0.6 mg tablet, Two tablets PO Once, then one tablet one hour later.  Do not repeat for 3 days., Disp: 30 tablet, Rfl: 0    fluconazole (DIFLUCAN) 200 MG Tab, 2 po twice weekly x 2 weeks prn tinea versicolor, Disp: 8 tablet, Rfl: 6    hydroCHLOROthiazide (HYDRODIURIL) 25 MG tablet, Take 25 mg by mouth once daily., Disp: , Rfl:     indomethacin (INDOCIN) 50 MG capsule, Take 1 capsule (50 mg total) by mouth 3 (three) times daily as needed (pain)., Disp: 30 capsule, Rfl: 1    magnesium oxide,aspartate,citr (TRIPLE MAGNESIUM COMPLEX) 400 mg magnesium Cap, 3 (three) times a day, Disp: , Rfl:     potassium citrate (UROCIT-K 15) 15 mEq TbSR, Take 2 tablets by mouth 2 (two) times a day., Disp: , Rfl:     pyridoxine, vitamin B6, (B-6) 100 MG Tab, 2 (two) times a day, Disp: , Rfl:     Review of Systems   Constitutional:  Negative for appetite change, fatigue, fever and unexpected weight change.   Respiratory:  Negative for cough, chest tightness, shortness of breath and wheezing.    Cardiovascular:  Negative for chest pain and leg swelling.   Gastrointestinal:  Negative for abdominal pain, constipation, nausea and vomiting.        -heartburn   Genitourinary:  Negative for decreased urine volume, difficulty urinating, dysuria and frequency.   Musculoskeletal:  Negative for arthralgias, back pain, myalgias and neck pain.   Skin:  Negative for rash.        +Skin lesion on nose   Neurological:  Negative for dizziness, weakness, numbness and headaches.   Hematological:  Does not bruise/bleed easily.   Psychiatric/Behavioral:  Negative for dysphoric mood, sleep disturbance and suicidal ideas. The patient is not nervous/anxious.    All other systems reviewed and are negative.         Objective:      Vitals:    06/14/24 0948   BP: 110/70   Pulse: 90   SpO2: 96%   Weight: 92.5 kg (204 lb)   Height: 6' 2" (1.88 m)       Wt Readings from " Last 3 Encounters:   06/14/24 92.5 kg (204 lb)   10/26/23 90.3 kg (199 lb)   04/13/23 88 kg (194 lb)         Physical Exam  Vitals reviewed.   Constitutional:       General: He is not in acute distress.     Appearance: Normal appearance. He is well-developed.   HENT:      Head: Normocephalic and atraumatic.   Neck:      Thyroid: No thyromegaly.   Cardiovascular:      Rate and Rhythm: Normal rate and regular rhythm.      Heart sounds: Normal heart sounds. No murmur heard.     No friction rub.   Pulmonary:      Effort: Pulmonary effort is normal.      Breath sounds: Normal breath sounds. No wheezing or rales.   Abdominal:      General: Bowel sounds are normal. There is no distension.      Palpations: Abdomen is soft.      Tenderness: There is no abdominal tenderness.   Musculoskeletal:      Cervical back: Neck supple.   Lymphadenopathy:      Cervical: No cervical adenopathy.   Skin:     General: Skin is warm and dry.      Findings: No rash.   Neurological:      Mental Status: He is alert and oriented to person, place, and time.   Psychiatric:         Attention and Perception: He is attentive.         Speech: Speech normal.         Behavior: Behavior normal.         Thought Content: Thought content normal.         Judgment: Judgment normal.           Assessment:       1. Diarrhea due to drug    2. Idiopathic chronic gout of left foot without tophus    3. Heartburn    4. Nephrolithiasis, uric acid    5. Long-term use of high-risk medication             Plan:       Diarrhea due to drug  Comments:  Chronic. Will continue to monitor frequency and consistency. May need stool study at some point. To continue to follow with    Idiopathic chronic gout of left foot without tophus  Comments:  Asymptomatic. Will continue to monitor symptoms    Heartburn  Comments:  Controlled. Will continue to monitor symptoms.    Nephrolithiasis, uric acid  Comments:  Controlled. To continue to follow with Dr. Urbina.    Long-term use of  high-risk medication  -     TSH w/reflex to FT4; Future; Expected date: 06/14/2024  -     Lipid Panel; Future; Expected date: 06/14/2024    Labs and/or tests have been ordered for the evaluation/monitoring of acute/chronic conditions, to be done just before next visit.    Follow up in about 6 months (around 12/14/2024) for Annual.        6/14/2024 Ketty Sheriff

## 2024-06-14 ENCOUNTER — OFFICE VISIT (OUTPATIENT)
Dept: FAMILY MEDICINE | Facility: CLINIC | Age: 39
End: 2024-06-14
Payer: COMMERCIAL

## 2024-06-14 VITALS
OXYGEN SATURATION: 96 % | WEIGHT: 204 LBS | HEART RATE: 90 BPM | BODY MASS INDEX: 26.18 KG/M2 | SYSTOLIC BLOOD PRESSURE: 110 MMHG | DIASTOLIC BLOOD PRESSURE: 70 MMHG | HEIGHT: 74 IN

## 2024-06-14 DIAGNOSIS — M1A.0720 IDIOPATHIC CHRONIC GOUT OF LEFT FOOT WITHOUT TOPHUS: ICD-10-CM

## 2024-06-14 DIAGNOSIS — N20.0 NEPHROLITHIASIS, URIC ACID: ICD-10-CM

## 2024-06-14 DIAGNOSIS — Z79.899 LONG-TERM USE OF HIGH-RISK MEDICATION: ICD-10-CM

## 2024-06-14 DIAGNOSIS — R12 HEARTBURN: ICD-10-CM

## 2024-06-14 DIAGNOSIS — K52.1 DIARRHEA DUE TO DRUG: Primary | ICD-10-CM

## 2024-06-14 PROCEDURE — 99214 OFFICE O/P EST MOD 30 MIN: CPT | Mod: S$GLB,,, | Performed by: FAMILY MEDICINE

## 2024-12-05 ENCOUNTER — TELEPHONE (OUTPATIENT)
Dept: FAMILY MEDICINE | Facility: CLINIC | Age: 39
End: 2024-12-05
Payer: COMMERCIAL

## 2024-12-17 NOTE — PROGRESS NOTES
SUBJECTIVE:    Patient ID: Chi Whitney is a 39 y.o. male.    Chief Complaint: Annual Exam (No bottles, patient is fasting for blood work, flu vaccine ordered, no complaints, abc )      Pt here for annual exam    Ran a half marathon last week in [place].  Exercises regularly. Has lost about 10 pounds since last visit.     Pt seeing Derm for lesion on his nose. Had to have a second round of treatment to get it frozen on his nose. Has not had it recur since then.     Has a little runny nose from allergies.    Continues to have diarrhea, still takes  magnesium oxide. If he misses the magnesium it goes away.  Taking three 400mg tablets daily.  Having 3-5 times per day.     Hx kidney stones. Continues to follow w/ Dr. Urbina (Q 6 m)   He is on potassium citrate from 4 tabs daily. Still on HCTZ, B6 and magnesium oxide. Has not had any kidney pain or passed any stones that he is aware of.  Has upcoming labs to do in Feb    Has had some gout flares since last visit. Nothing extreme and has taken colchicine as needed. Seems like when he runs a lot it will trigger a gout flare in his feet. Tries to keep hydrated a lot.     Saw Dr. Alarcon 1/2024, with [facility]. (Yearly)    No longer having as much heartburn issues now that he is taking potassium citrate in the morning and at lunch.     Had labs done : TSH 0.99,   ------------------------------------------------          Office Visit on 06/14/2024   Component Date Value Ref Range Status    TSH w/reflex to FT4 12/06/2024 0.99  0.40 - 4.50 mIU/L Final    Cholesterol 12/06/2024 216 (H)  <200 mg/dL Final    HDL 12/06/2024 72  > OR = 40 mg/dL Final    Triglycerides 12/06/2024 156 (H)  <150 mg/dL Final    LDL Cholesterol 12/06/2024 117 (H)  mg/dL (calc) Final    Comment: Reference range: <100     Desirable range <100 mg/dL for primary prevention;    <70 mg/dL for patients with CHD or diabetic patients   with > or = 2 CHD risk factors.     LDL-C is now calculated using the  Maritza   calculation, which is a validated novel method providing   better accuracy than the Friedewald equation in the   estimation of LDL-C.   Rohan PATIÑO et al. RICKY. 2013;310(19): 4047-0471   (http://[employer].vitalclip/faq/ZYG443)      HDL/Cholesterol Ratio 12/06/2024 3.0  <5.0 (calc) Final    Non HDL Chol. (LDL+VLDL) 12/06/2024 144 (H)  <130 mg/dL (calc) Final    Comment: For patients with diabetes plus 1 major ASCVD risk   factor, treating to a non-HDL-C goal of <100 mg/dL   (LDL-C of <70 mg/dL) is considered a therapeutic   option.      l      No past medical history on file.  Social History     Socioeconomic History    Marital status:    Tobacco Use    Smoking status: Never    Smokeless tobacco: Never   Substance and Sexual Activity    Alcohol use: Never     Past Surgical History:   Procedure Laterality Date    DENTAL SURGERY      EYE SURGERY Right     muscle surgery x 2    LITHOTRIPSY      VASECTOMY       Family History   Problem Relation Name Age of Onset    Cancer Father         Review of patient's allergies indicates:  No Known Allergies    Current Outpatient Medications:     colchicine (COLCRYS) 0.6 mg tablet, Two tablets PO Once, then one tablet one hour later.  Do not repeat for 3 days., Disp: 30 tablet, Rfl: 0    hydroCHLOROthiazide (HYDRODIURIL) 25 MG tablet, Take 25 mg by mouth once daily., Disp: , Rfl:     indomethacin (INDOCIN) 50 MG capsule, Take 1 capsule (50 mg total) by mouth 3 (three) times daily as needed (pain)., Disp: 30 capsule, Rfl: 1    magnesium oxide,aspartate,citr (TRIPLE MAGNESIUM COMPLEX) 400 mg magnesium Cap, 3 (three) times a day, Disp: , Rfl:     potassium citrate (UROCIT-K 15) 15 mEq TbSR, Take 2 tablets by mouth 2 (two) times a day., Disp: , Rfl:     pyridoxine, vitamin B6, (B-6) 100 MG Tab, 2 (two) times a day, Disp: , Rfl:   No current facility-administered medications for this visit.    Review of Systems   Constitutional:  Negative for appetite  "change, fatigue, fever and unexpected weight change.   Respiratory:  Negative for cough, chest tightness, shortness of breath and wheezing.    Cardiovascular:  Negative for chest pain and leg swelling.   Gastrointestinal:  Negative for abdominal pain, constipation, nausea and vomiting.        -heartburn   Genitourinary:  Negative for decreased urine volume, difficulty urinating, dysuria and frequency.   Musculoskeletal:  Negative for arthralgias, back pain, myalgias and neck pain.   Skin:  Negative for rash.        +Skin lesion on nose   Neurological:  Negative for dizziness, weakness, numbness and headaches.   Hematological:  Does not bruise/bleed easily.   Psychiatric/Behavioral:  Negative for dysphoric mood, sleep disturbance and suicidal ideas. The patient is not nervous/anxious.    All other systems reviewed and are negative.         Objective:      Vitals:    12/20/24 0825   BP: 112/70   Pulse: 64   SpO2: 97%   Weight: 87.5 kg (193 lb)   Height: 6' 2" (1.88 m)         Wt Readings from Last 3 Encounters:   12/20/24 87.5 kg (193 lb)   06/14/24 92.5 kg (204 lb)   10/26/23 90.3 kg (199 lb)         Physical Exam  Vitals reviewed.   Constitutional:       General: He is not in acute distress.     Appearance: Normal appearance. He is well-developed.   HENT:      Head: Normocephalic and atraumatic.   Neck:      Thyroid: No thyromegaly.   Cardiovascular:      Rate and Rhythm: Normal rate and regular rhythm.      Heart sounds: Normal heart sounds. No murmur heard.     No friction rub.   Pulmonary:      Effort: Pulmonary effort is normal.      Breath sounds: Normal breath sounds. No wheezing or rales.   Abdominal:      General: Bowel sounds are normal. There is no distension.      Palpations: Abdomen is soft.      Tenderness: There is no abdominal tenderness.   Musculoskeletal:      Cervical back: Neck supple.   Lymphadenopathy:      Cervical: No cervical adenopathy.   Skin:     General: Skin is warm and dry.      " Findings: No rash.   Neurological:      Mental Status: He is alert and oriented to person, place, and time.   Psychiatric:         Attention and Perception: He is attentive.         Speech: Speech normal.         Behavior: Behavior normal.         Thought Content: Thought content normal.         Judgment: Judgment normal.           Assessment:       1. Annual physical exam    2. Idiopathic chronic gout of left foot without tophus    3. Diarrhea due to drug    4. Mixed hyperlipidemia    5. Influenza vaccine administered               Plan:       Annual physical exam    Idiopathic chronic gout of left foot without tophus  Comments:  Chronic, intermittent. To continue prn colchicine.    Diarrhea due to drug  Comments:  Chronic. Will continue to monitor symptoms on magnesium    Mixed hyperlipidemia  Comments:  Suboptimal. . Encouraged regular exercise and dietary discretion.    Influenza vaccine administered  -     influenza (Flulaval, Fluzone, Fluarix) 45 mcg/0.5 mL IM vaccine (> or = 6 mo) 0.5 mL      Other  Lab results discussed and reviewed with patient.    Follow up in about 6 months (around 6/20/2025) for Gout, diarrhea.        12/20/2024 Ketty Sheriff

## 2024-12-20 ENCOUNTER — OFFICE VISIT (OUTPATIENT)
Dept: FAMILY MEDICINE | Facility: CLINIC | Age: 39
End: 2024-12-20
Payer: COMMERCIAL

## 2024-12-20 VITALS
SYSTOLIC BLOOD PRESSURE: 112 MMHG | OXYGEN SATURATION: 97 % | BODY MASS INDEX: 24.77 KG/M2 | WEIGHT: 193 LBS | HEIGHT: 74 IN | HEART RATE: 64 BPM | DIASTOLIC BLOOD PRESSURE: 70 MMHG

## 2024-12-20 DIAGNOSIS — M1A.0720 IDIOPATHIC CHRONIC GOUT OF LEFT FOOT WITHOUT TOPHUS: ICD-10-CM

## 2024-12-20 DIAGNOSIS — K52.1 DIARRHEA DUE TO DRUG: ICD-10-CM

## 2024-12-20 DIAGNOSIS — Z23 INFLUENZA VACCINE ADMINISTERED: ICD-10-CM

## 2024-12-20 DIAGNOSIS — Z00.00 ANNUAL PHYSICAL EXAM: Primary | ICD-10-CM

## 2024-12-20 DIAGNOSIS — E78.2 MIXED HYPERLIPIDEMIA: ICD-10-CM

## 2025-04-24 ENCOUNTER — HOSPITAL ENCOUNTER (OUTPATIENT)
Dept: RADIOLOGY | Facility: HOSPITAL | Age: 40
Discharge: HOME | End: 2025-04-24
Attending: SPECIALIST
Payer: COMMERCIAL

## 2025-04-24 DIAGNOSIS — N20.0 URIC ACID NEPHROLITHIASIS: Primary | ICD-10-CM

## 2025-04-24 DIAGNOSIS — N20.0 URIC ACID NEPHROLITHIASIS: ICD-10-CM

## 2025-04-24 PROCEDURE — 74018 RADEX ABDOMEN 1 VIEW: CPT | Mod: 26,,, | Performed by: RADIOLOGY

## 2025-04-24 PROCEDURE — 74018 RADEX ABDOMEN 1 VIEW: CPT | Mod: TC,PO

## 2025-06-23 ENCOUNTER — OFFICE VISIT (OUTPATIENT)
Dept: FAMILY MEDICINE | Facility: CLINIC | Age: 40
End: 2025-06-23
Payer: COMMERCIAL

## 2025-06-23 VITALS
HEIGHT: 74 IN | OXYGEN SATURATION: 98 % | SYSTOLIC BLOOD PRESSURE: 122 MMHG | WEIGHT: 189 LBS | DIASTOLIC BLOOD PRESSURE: 75 MMHG | BODY MASS INDEX: 24.26 KG/M2 | HEART RATE: 84 BPM

## 2025-06-23 DIAGNOSIS — Z79.899 LONG-TERM USE OF HIGH-RISK MEDICATION: ICD-10-CM

## 2025-06-23 DIAGNOSIS — M1A.0720 IDIOPATHIC CHRONIC GOUT OF LEFT FOOT WITHOUT TOPHUS: ICD-10-CM

## 2025-06-23 DIAGNOSIS — K52.1 DIARRHEA DUE TO DRUG: ICD-10-CM

## 2025-06-23 DIAGNOSIS — M1A.0710 CHRONIC IDIOPATHIC GOUT INVOLVING TOE OF RIGHT FOOT WITHOUT TOPHUS: Primary | ICD-10-CM

## 2025-06-23 PROCEDURE — 99213 OFFICE O/P EST LOW 20 MIN: CPT | Mod: S$GLB,,, | Performed by: FAMILY MEDICINE

## 2025-06-23 RX ORDER — ALLOPURINOL 100 MG/1
100 TABLET ORAL DAILY
Qty: 90 TABLET | Refills: 3 | Status: SHIPPED | OUTPATIENT
Start: 2025-06-23

## 2025-06-23 NOTE — Clinical Note
Could you request pt labs from Dr. Urbina from May. They also may have been done at [facility]

## 2025-06-23 NOTE — PROGRESS NOTES
SUBJECTIVE:    Patient ID: Chi Whitney is a 40 y.o. male.    Chief Complaint: Follow-up (6 mo f/u//no med bottles//discuss gout medication//tc)      Pt here to checkup on acute and chronic conditions.    Has not been Exercising as regularly as he should.  Has lost about 4 pounds since last visit.         Diarrhea from magnesium oxide is better now that he takes it throughout the day and with a probiotic.  Taking three 400mg tablets daily.      Hx kidney stones. Continues to follow w/ Dr. Urbina (Q 6 m), last saw on May 1 with labs.   He is on potassium citrate from 4 tabs daily. Still on HCTZ, B6 and magnesium oxide. Has not had any kidney pain or passed any stones that he is aware of.       Not long after long visit, took his HCTZ twice in a day by mistake, and had a gout flare after that, that would never seem to go away in the right big toe and would never seem to go away. It would get better on the colcrys but it would not seem to stay away.  Now since then ever time he take his HCTZ it seems to come back. Has been on allopurinol in the past but it was stopped for some time.     Saw Dr. Alarcon 5/2025, with [facility]. (Yearly)    No longer having as much heartburn issues now that he is taking potassium citrate in the morning and at lunch.     No recent labs.  ------------------------------------------------          No visits with results within 7 Month(s) from this visit.   Latest known visit with results is:   Office Visit on 06/14/2024   Component Date Value Ref Range Status    TSH w/reflex to FT4 12/06/2024 0.99  0.40 - 4.50 mIU/L Final    Cholesterol 12/06/2024 216 (H)  <200 mg/dL Final    HDL 12/06/2024 72  > OR = 40 mg/dL Final    Triglycerides 12/06/2024 156 (H)  <150 mg/dL Final    LDL Cholesterol 12/06/2024 117 (H)  mg/dL (calc) Final    Comment: Reference range: <100     Desirable range <100 mg/dL for primary prevention;    <70 mg/dL for patients with CHD or diabetic patients   with > or = 2 CHD  risk factors.     LDL-C is now calculated using the Maritza   calculation, which is a validated novel method providing   better accuracy than the Friedewald equation in the   estimation of LDL-C.   Rohan PATIÑO et al. RICKY. 2013;310(19): 9339-5457   (http://education.[employer]/faq/QBH164)      HDL/Cholesterol Ratio 12/06/2024 3.0  <5.0 (calc) Final    Non HDL Chol. (LDL+VLDL) 12/06/2024 144 (H)  <130 mg/dL (calc) Final    Comment: For patients with diabetes plus 1 major ASCVD risk   factor, treating to a non-HDL-C goal of <100 mg/dL   (LDL-C of <70 mg/dL) is considered a therapeutic   option.      l      History reviewed. No pertinent past medical history.  Social History     Socioeconomic History    Marital status:    Tobacco Use    Smoking status: Never    Smokeless tobacco: Never   Substance and Sexual Activity    Alcohol use: Never     Past Surgical History:   Procedure Laterality Date    DENTAL SURGERY      EYE SURGERY Right     muscle surgery x 2    LITHOTRIPSY      VASECTOMY       Family History   Problem Relation Name Age of Onset    Cancer Father         Review of patient's allergies indicates:  No Known Allergies    Current Outpatient Medications:     colchicine (COLCRYS) 0.6 mg tablet, Two tablets PO Once, then one tablet one hour later.  Do not repeat for 3 days., Disp: 30 tablet, Rfl: 0    hydroCHLOROthiazide (HYDRODIURIL) 25 MG tablet, Take 25 mg by mouth once daily., Disp: , Rfl:     magnesium oxide,aspartate,citr (TRIPLE MAGNESIUM COMPLEX) 400 mg magnesium Cap, 3 (three) times a day, Disp: , Rfl:     mv-min/FA/vit K/lutein/zeaxant (PRESERVISION AREDS 2 PLUS MV ORAL), Take by mouth., Disp: , Rfl:     potassium citrate (UROCIT-K 15) 15 mEq TbSR, Take 2 tablets by mouth 2 (two) times a day., Disp: , Rfl:     pyridoxine, vitamin B6, (B-6) 100 MG Tab, 2 (two) times a day, Disp: , Rfl:     allopurinoL (ZYLOPRIM) 100 MG tablet, Take 1 tablet (100 mg total) by mouth once daily., Disp:  "90 tablet, Rfl: 3    Review of Systems   Constitutional:  Negative for appetite change, fatigue, fever and unexpected weight change.   Respiratory:  Negative for cough, chest tightness, shortness of breath and wheezing.    Cardiovascular:  Negative for chest pain and leg swelling.   Gastrointestinal:  Negative for abdominal pain, constipation, nausea and vomiting.        -heartburn   Genitourinary:  Negative for decreased urine volume, difficulty urinating, dysuria and frequency.   Musculoskeletal:  Negative for arthralgias, back pain, myalgias and neck pain.   Skin:  Negative for rash.   Neurological:  Negative for dizziness, weakness, numbness and headaches.   Hematological:  Does not bruise/bleed easily.   Psychiatric/Behavioral:  Negative for dysphoric mood, sleep disturbance and suicidal ideas. The patient is not nervous/anxious.    All other systems reviewed and are negative.         Objective:      Vitals:    06/23/25 0825   BP: 122/75   Pulse: 84   SpO2: 98%   Weight: 85.7 kg (189 lb)   Height: 6' 2" (1.88 m)           Wt Readings from Last 3 Encounters:   06/23/25 85.7 kg (189 lb)   12/20/24 87.5 kg (193 lb)   06/14/24 92.5 kg (204 lb)         Physical Exam  Vitals reviewed.   Constitutional:       General: He is not in acute distress.     Appearance: Normal appearance. He is well-developed.   HENT:      Head: Normocephalic and atraumatic.   Neck:      Thyroid: No thyromegaly.   Cardiovascular:      Rate and Rhythm: Normal rate and regular rhythm.      Heart sounds: Normal heart sounds. No murmur heard.     No friction rub.   Pulmonary:      Effort: Pulmonary effort is normal.      Breath sounds: Normal breath sounds. No wheezing or rales.   Abdominal:      General: Bowel sounds are normal. There is no distension.      Palpations: Abdomen is soft.      Tenderness: There is no abdominal tenderness.   Musculoskeletal:      Cervical back: Neck supple.   Lymphadenopathy:      Cervical: No cervical adenopathy. "   Skin:     General: Skin is warm and dry.      Findings: No rash.   Neurological:      Mental Status: He is alert and oriented to person, place, and time.   Psychiatric:         Attention and Perception: He is attentive.         Speech: Speech normal.         Behavior: Behavior normal.         Thought Content: Thought content normal.         Judgment: Judgment normal.           Assessment:       1. Chronic idiopathic gout involving toe of right foot without tophus    2. Diarrhea due to drug    3. Long-term use of high-risk medication    4. Idiopathic chronic gout of left foot without tophus                 Plan:       Chronic idiopathic gout involving toe of right foot without tophus  Comments:  Chronic. Symptomatic lately due to drug (HCTZ). Will start on allopurinol. To resume HCTZ after one week. To take colchicine prn attacks.  Orders:  -     TSH w/reflex to FT4; Future; Expected date: 06/23/2025  -     Urinalysis, Reflex to Urine Culture Urine, Clean Catch; Future; Expected date: 06/23/2025  -     CBC Auto Differential; Future; Expected date: 06/23/2025  -     Lipid Panel; Future; Expected date: 06/23/2025  -     Comprehensive Metabolic Panel; Future; Expected date: 06/23/2025  -     allopurinoL (ZYLOPRIM) 100 MG tablet; Take 1 tablet (100 mg total) by mouth once daily.  Dispense: 90 tablet; Refill: 3  -     URIC ACID; Future; Expected date: 06/23/2025    Diarrhea due to drug  Comments:  Chronic. Will continue to monitor symptoms.    Long-term use of high-risk medication  -     TSH w/reflex to FT4; Future; Expected date: 06/23/2025  -     Urinalysis, Reflex to Urine Culture Urine, Clean Catch; Future; Expected date: 06/23/2025  -     CBC Auto Differential; Future; Expected date: 06/23/2025  -     Lipid Panel; Future; Expected date: 06/23/2025  -     Comprehensive Metabolic Panel; Future; Expected date: 06/23/2025  -     URIC ACID; Future; Expected date: 06/23/2025    Idiopathic chronic gout of left foot  without tophus      Labs and/or tests have been ordered for the evaluation/monitoring of acute/chronic conditions, to be done just before next visit.    Follow up in about 6 months (around 12/23/2025) for Gout.        6/23/2025 Ketty Sheriff